# Patient Record
Sex: FEMALE | URBAN - METROPOLITAN AREA
[De-identification: names, ages, dates, MRNs, and addresses within clinical notes are randomized per-mention and may not be internally consistent; named-entity substitution may affect disease eponyms.]

---

## 2019-05-26 ENCOUNTER — NURSE TRIAGE (OUTPATIENT)
Dept: NURSING | Facility: CLINIC | Age: 29
End: 2019-05-26

## 2019-05-26 NOTE — TELEPHONE ENCOUNTER
Patient calling reporting she is 39 weeks pregnant and removed a tick from her abdomin. Patient stating pieces of the tick are remaining. Area is red from removal. Stating it would have likely attached last evening.   Afebrile. Denies other symptoms.     Per guidelines advised to be seen with in 24 hours. Caller verbalized understanding. Denies further questions.    Patient stating she will go into Urgent Care.     Cathie Coulter RN  [facility]        Reason for Disposition    Can't remove tick's head that was broken off in the skin (after trying Care Advice)    Additional Information    Negative: Sounds like a life-threatening emergency to the triager    Negative: Not a tick bite    Negative: [1] 2 to 14 days following tick bite AND [2] severe headache with fever occurs    Negative: [1] 2 to 14 days following tick bite AND [2] widespread rash with fever occurs    Negative: Patient sounds very sick or weak to the triager    Negative: [1] Fever AND [2] red area    Negative: [1] Fever AND [2] area is very tender to touch    Negative: [1] Red streak or red line AND [2] length > 2 inches (5 cm)    Negative: Can't remove live tick (after trying Care Advice)    Protocols used: TICK BITE-A-